# Patient Record
Sex: FEMALE | Race: WHITE
[De-identification: names, ages, dates, MRNs, and addresses within clinical notes are randomized per-mention and may not be internally consistent; named-entity substitution may affect disease eponyms.]

---

## 2017-10-06 ENCOUNTER — HOSPITAL ENCOUNTER (OUTPATIENT)
Dept: HOSPITAL 92 - MAMMO | Age: 82
Discharge: HOME | End: 2017-10-06
Attending: FAMILY MEDICINE
Payer: MEDICARE

## 2017-10-06 DIAGNOSIS — Z12.31: Primary | ICD-10-CM

## 2017-10-06 PROCEDURE — G0202 SCR MAMMO BI INCL CAD: HCPCS

## 2017-10-06 PROCEDURE — 77067 SCR MAMMO BI INCL CAD: CPT

## 2017-10-06 NOTE — MMO
BILATERAL MAMMOGRAMS:

 

DATE:  10/06/17 

 

HISTORY:  

Screening mammography. 

 

COMPARISON:  

08/01/13 and 08/23/16. 

 

FINDINGS:

Scattered fibroglandular densities and benign-appearing calcifications are again demonstrated. Focal
 asymmetry deep within the inferior medial aspect of the right breast is stable. There is no new dom
inant mass or suspicious calcifications. 

 

The study was evaluated with the assistance of computer-aided detection. 

 

IMPRESSION: 

 

BIRADS 2:  Benign Finding(s)

 

Suggest routine follow-up.   

 

POS: GISSEL

## 2019-10-02 ENCOUNTER — HOSPITAL ENCOUNTER (INPATIENT)
Dept: HOSPITAL 57 - BURMED | Age: 84
LOS: 3 days | Discharge: NURSING, REHAB OR LONG-TERM CARE | DRG: 746 | End: 2019-10-05
Attending: FAMILY MEDICINE | Admitting: FAMILY MEDICINE
Payer: MEDICARE

## 2019-10-02 VITALS — BODY MASS INDEX: 24.2 KG/M2

## 2019-10-02 DIAGNOSIS — H81.03: ICD-10-CM

## 2019-10-02 DIAGNOSIS — H91.8X3: ICD-10-CM

## 2019-10-02 DIAGNOSIS — N75.0: ICD-10-CM

## 2019-10-02 DIAGNOSIS — N76.2: ICD-10-CM

## 2019-10-02 DIAGNOSIS — I10: ICD-10-CM

## 2019-10-02 DIAGNOSIS — N76.4: Primary | ICD-10-CM

## 2019-10-02 DIAGNOSIS — L03.818: ICD-10-CM

## 2019-10-02 DIAGNOSIS — M85.80: ICD-10-CM

## 2019-10-02 DIAGNOSIS — Z90.710: ICD-10-CM

## 2019-10-02 LAB
ALBUMIN SERPL BCG-MCNC: 4.4 G/DL (ref 3.4–4.8)
ALP SERPL-CCNC: 113 U/L (ref 40–110)
ALT SERPL W P-5'-P-CCNC: 11 U/L (ref 8–55)
ANION GAP SERPL CALC-SCNC: 15 MMOL/L (ref 10–20)
AST SERPL-CCNC: 17 U/L (ref 5–34)
BASOPHILS # BLD AUTO: 0.1 THOU/UL (ref 0–0.2)
BASOPHILS NFR BLD AUTO: 1.2 % (ref 0–1)
BILIRUB SERPL-MCNC: 0.5 MG/DL (ref 0.2–1.2)
BUN SERPL-MCNC: 18 MG/DL (ref 9.8–20.1)
CALCIUM SERPL-MCNC: 9.8 MG/DL (ref 7.8–10.44)
CHLORIDE SERPL-SCNC: 104 MMOL/L (ref 98–107)
CO2 SERPL-SCNC: 26 MMOL/L (ref 23–31)
CREAT CL PREDICTED SERPL C-G-VRATE: 47 ML/MIN (ref 70–130)
EOSINOPHIL # BLD AUTO: 0.2 THOU/UL (ref 0–0.7)
EOSINOPHIL NFR BLD AUTO: 2 % (ref 0–10)
GLOBULIN SER CALC-MCNC: 3.3 G/DL (ref 2.4–3.5)
GLUCOSE SERPL-MCNC: 143 MG/DL (ref 83–110)
HGB BLD-MCNC: 12.6 G/DL (ref 12–16)
LYMPHOCYTES # BLD AUTO: 2.2 THOU/UL (ref 1.2–3.4)
LYMPHOCYTES NFR BLD AUTO: 21.7 % (ref 21–51)
MCH RBC QN AUTO: 27.8 PG (ref 27–31)
MCV RBC AUTO: 88 FL (ref 78–98)
MONOCYTES # BLD AUTO: 0.5 THOU/UL (ref 0.11–0.59)
MONOCYTES NFR BLD AUTO: 4.5 % (ref 0–10)
NEUTROPHILS # BLD AUTO: 7.2 THOU/UL (ref 1.4–6.5)
NEUTROPHILS NFR BLD AUTO: 70.6 % (ref 42–75)
PLATELET # BLD AUTO: 372 THOU/UL (ref 130–400)
POTASSIUM SERPL-SCNC: 3.8 MMOL/L (ref 3.5–5.1)
RBC # BLD AUTO: 4.53 MILL/UL (ref 4.2–5.4)
SODIUM SERPL-SCNC: 141 MMOL/L (ref 136–145)
WBC # BLD AUTO: 10.1 THOU/UL (ref 4.8–10.8)

## 2019-10-02 PROCEDURE — 87040 BLOOD CULTURE FOR BACTERIA: CPT

## 2019-10-02 PROCEDURE — 80053 COMPREHEN METABOLIC PANEL: CPT

## 2019-10-02 PROCEDURE — 97602 WOUND(S) CARE NON-SELECTIVE: CPT

## 2019-10-02 PROCEDURE — 36415 COLL VENOUS BLD VENIPUNCTURE: CPT

## 2019-10-02 PROCEDURE — 85025 COMPLETE CBC W/AUTO DIFF WBC: CPT

## 2019-10-02 PROCEDURE — 80202 ASSAY OF VANCOMYCIN: CPT

## 2019-10-02 PROCEDURE — 0U9GXZZ DRAINAGE OF VAGINA, EXTERNAL APPROACH: ICD-10-PCS | Performed by: FAMILY MEDICINE

## 2019-10-02 PROCEDURE — 80048 BASIC METABOLIC PNL TOTAL CA: CPT

## 2019-10-02 RX ADMIN — VANCOMYCIN HYDROCHLORIDE SCH MLS: 1 INJECTION, POWDER, LYOPHILIZED, FOR SOLUTION INTRAVENOUS at 15:34

## 2019-10-03 LAB
ANION GAP SERPL CALC-SCNC: 12 MMOL/L (ref 10–20)
BASOPHILS # BLD AUTO: 0.2 THOU/UL (ref 0–0.2)
BASOPHILS NFR BLD AUTO: 2.1 % (ref 0–1)
BUN SERPL-MCNC: 16 MG/DL (ref 9.8–20.1)
CALCIUM SERPL-MCNC: 9.1 MG/DL (ref 7.8–10.44)
CHLORIDE SERPL-SCNC: 106 MMOL/L (ref 98–107)
CO2 SERPL-SCNC: 26 MMOL/L (ref 23–31)
CREAT CL PREDICTED SERPL C-G-VRATE: 57 ML/MIN (ref 70–130)
EOSINOPHIL # BLD AUTO: 0.3 THOU/UL (ref 0–0.7)
EOSINOPHIL NFR BLD AUTO: 3.8 % (ref 0–10)
GLUCOSE SERPL-MCNC: 90 MG/DL (ref 83–110)
HGB BLD-MCNC: 12 G/DL (ref 12–16)
LYMPHOCYTES # BLD AUTO: 2.5 THOU/UL (ref 1.2–3.4)
LYMPHOCYTES NFR BLD AUTO: 30.6 % (ref 21–51)
MCH RBC QN AUTO: 28.2 PG (ref 27–31)
MCV RBC AUTO: 86.1 FL (ref 78–98)
MONOCYTES # BLD AUTO: 0.7 THOU/UL (ref 0.11–0.59)
MONOCYTES NFR BLD AUTO: 8.8 % (ref 0–10)
NEUTROPHILS # BLD AUTO: 4.4 THOU/UL (ref 1.4–6.5)
NEUTROPHILS NFR BLD AUTO: 54.7 % (ref 42–75)
PLATELET # BLD AUTO: 300 THOU/UL (ref 130–400)
POTASSIUM SERPL-SCNC: 4.2 MMOL/L (ref 3.5–5.1)
RBC # BLD AUTO: 4.24 MILL/UL (ref 4.2–5.4)
SODIUM SERPL-SCNC: 140 MMOL/L (ref 136–145)
WBC # BLD AUTO: 8 THOU/UL (ref 4.8–10.8)

## 2019-10-03 RX ADMIN — Medication SCH ML: at 20:22

## 2019-10-03 RX ADMIN — POLYETHYLENE GLYCOL 400 AND PROPYLENE GLYCOL SCH DROP: 4; 3 SOLUTION/ DROPS OPHTHALMIC at 08:46

## 2019-10-03 RX ADMIN — VANCOMYCIN HYDROCHLORIDE SCH MLS: 1 INJECTION, POWDER, LYOPHILIZED, FOR SOLUTION INTRAVENOUS at 15:25

## 2019-10-04 LAB — VANCOMYCIN TROUGH SERPL-MCNC: 7.5 UG/ML

## 2019-10-04 RX ADMIN — POLYETHYLENE GLYCOL 400 AND PROPYLENE GLYCOL SCH DROP: 4; 3 SOLUTION/ DROPS OPHTHALMIC at 09:30

## 2019-10-04 RX ADMIN — Medication SCH ML: at 09:29

## 2019-10-04 RX ADMIN — Medication SCH ML: at 19:53

## 2019-10-05 ENCOUNTER — HOSPITAL ENCOUNTER (INPATIENT)
Dept: HOSPITAL 57 - BURMED | Age: 84
LOS: 5 days | Discharge: HOME HEALTH | DRG: 758 | End: 2019-10-10
Attending: FAMILY MEDICINE | Admitting: FAMILY MEDICINE
Payer: MEDICARE

## 2019-10-05 VITALS — TEMPERATURE: 98.3 F | SYSTOLIC BLOOD PRESSURE: 142 MMHG | DIASTOLIC BLOOD PRESSURE: 78 MMHG

## 2019-10-05 VITALS — BODY MASS INDEX: 24.4 KG/M2

## 2019-10-05 DIAGNOSIS — B95.61: ICD-10-CM

## 2019-10-05 DIAGNOSIS — N76.4: Primary | ICD-10-CM

## 2019-10-05 DIAGNOSIS — H81.03: ICD-10-CM

## 2019-10-05 DIAGNOSIS — L03.315: ICD-10-CM

## 2019-10-05 DIAGNOSIS — I10: ICD-10-CM

## 2019-10-05 DIAGNOSIS — R53.81: ICD-10-CM

## 2019-10-05 RX ADMIN — Medication SCH ML: at 08:26

## 2019-10-05 RX ADMIN — Medication SCH ML: at 21:06

## 2019-10-05 RX ADMIN — POLYETHYLENE GLYCOL 400 AND PROPYLENE GLYCOL SCH DROP: 4; 3 SOLUTION/ DROPS OPHTHALMIC at 08:26

## 2019-10-06 RX ADMIN — Medication SCH: at 09:04

## 2019-10-06 RX ADMIN — Medication SCH: at 21:46

## 2019-10-06 RX ADMIN — POLYETHYLENE GLYCOL 400 AND PROPYLENE GLYCOL SCH DRP: 4; 3 SOLUTION/ DROPS OPHTHALMIC at 09:03

## 2019-10-07 RX ADMIN — POLYETHYLENE GLYCOL 400 AND PROPYLENE GLYCOL SCH DRP: 4; 3 SOLUTION/ DROPS OPHTHALMIC at 08:12

## 2019-10-07 RX ADMIN — Medication SCH: at 08:13

## 2019-10-08 RX ADMIN — POLYETHYLENE GLYCOL 400 AND PROPYLENE GLYCOL SCH DRP: 4; 3 SOLUTION/ DROPS OPHTHALMIC at 08:21

## 2019-10-09 RX ADMIN — POLYETHYLENE GLYCOL 400 AND PROPYLENE GLYCOL SCH DRP: 4; 3 SOLUTION/ DROPS OPHTHALMIC at 09:05

## 2019-10-10 VITALS — DIASTOLIC BLOOD PRESSURE: 65 MMHG | TEMPERATURE: 98.1 F | SYSTOLIC BLOOD PRESSURE: 155 MMHG

## 2019-10-10 RX ADMIN — POLYETHYLENE GLYCOL 400 AND PROPYLENE GLYCOL SCH DRP: 4; 3 SOLUTION/ DROPS OPHTHALMIC at 09:00

## 2019-10-10 NOTE — DIS
DATE OF ADMISSION:  10/02/2019



DATE OF TRANSFER TO [facility]:  10/05/2019



DATE OF DISCHARGE:  10/10/2019



ADMISSION DIAGNOSES:  

1. Vulvar abscess.

2. Cellulitis of perineum.

3. Hypertension.

4. Comorbid diagnosis, Meniere disease.



DISCHARGE DIAGNOSES:  

1. Vulvar abscess with methicillin-susceptible Staphylococcus aureus.

2. Cellulitis of perineum.

3. Hypertension.

4. Physical deconditioning.

5. Comorbid diagnosis, Meniere disease.



PROCEDURES:  

1. Incision and drainage of vulvar abscess, October 2, performed outpatient the 
date of

admission. 

2. Blood cultures x2, date of admission, negative for growth.

3. Bacterial culture from incision and drainage from the date of admission, 
which

grew out Staph aureus, resistant to amoxicillin, but otherwise pansensitive. 

4. Re-exploration of wound with repacking by physician, October 5, 2019. 



HISTORY AND PHYSICAL:  Please see printed progress notes and history and 
physical

update from the date of admission. 



HOSPITAL COURSE:  Ms. Vizcarra is an 84-year-old  female, who 
presented in

the ambulatory setting as a new patient to my office complaining of increased 
swelling and

discomfort to the right vulva with bloody discharge.  In the office, the 
patient was

found to have vulvar abscess with spread of erythema to the mons pubis region.  
The

wound was incised and drained in the office and culture performed, which

subsequently grew out MSSA.  The wound was packed, and due to the patient living

alone and the extending cellulitis, decision was made to direct admit the 
patient to

the hospital for IV antibiotics, wound care, and pain control.  She was placed 
empirically

on vancomycin and this was discontinued and oral Keflex provided once 
sensitivities

were back, and the patient was no longer having significant purulent discharge.

Wound care was performed daily.  On the day prior to her discharge from the 
swing

bed unit, the patient's packing was left out and she was able to complete her 
ADLs.

She also had physical deconditioning during her hospitalization and physical 
therapy

evaluated and treated the patient.  It was determined at the time of her 
discharge

that she would need rolling walker and home health with PT and skilled nursing 
for

continued wound care. 



The patient had significant elevation of her blood pressure during her

hospitalization on her home medication of Bystolic.  Amlodipine was added with 
good

control of her blood pressure thereafter.  She will be discharged on this new

medication. 



The patient has had history of dyspepsia in the past and sensitivity to oral

medications.  She noted improvement of her usual symptoms when she was given 
Pepcid

b.i.d. for GI stress ulcer prophylaxis.  Therefore, she will also be discharged 
on

this new medication. 



The patient would like to follow up with her primary care physician, Dr. Sahni,

and has been instructed to do so in approximately 7 days for recheck of her 
wound

and also her blood pressure. 



DISPOSITION:  Discharged to home with home health for skilled nursing and 
physical

therapy. 



CONDITION:  Good.



MEDICATIONS:  

1. Amlodipine 5 mg p.o. daily.

2. Keflex 500 mg p.o. q.8 hours x2 days, to complete a total of 10 days of

antibiotics. 

3. Pepcid 20 mg p.o. b.i.d.

4. Bystolic 5 mg p.o. daily.

5. Biotin 1000 mcg daily.

6. Simethicone gas relief 125 mg p.o. b.i.d.

7. Systane ophthalmic solution one drop in each eye daily.







Job ID:  [id number]



[facility]